# Patient Record
Sex: FEMALE | Race: WHITE | Employment: OTHER | URBAN - METROPOLITAN AREA
[De-identification: names, ages, dates, MRNs, and addresses within clinical notes are randomized per-mention and may not be internally consistent; named-entity substitution may affect disease eponyms.]

---

## 2022-02-23 LAB
HBSAG, EXTERNAL: NEGATIVE
HIV, EXTERNAL: NEGATIVE
RPR, EXTERNAL: NON REACTIVE
RUBELLA, EXTERNAL: NORMAL
TYPE, ABO & RH, EXTERNAL: NORMAL

## 2022-05-31 ENCOUNTER — HOSPITAL ENCOUNTER (OUTPATIENT)
Dept: PERINATAL CARE | Age: 32
Discharge: HOME | End: 2022-05-31
Attending: OBSTETRICS & GYNECOLOGY
Payer: COMMERCIAL

## 2022-05-31 PROCEDURE — 76811 OB US DETAILED SNGL FETUS: CPT | Performed by: OBSTETRICS & GYNECOLOGY

## 2022-06-28 ENCOUNTER — HOSPITAL ENCOUNTER (OUTPATIENT)
Dept: PERINATAL CARE | Age: 32
Discharge: HOME | End: 2022-06-28
Attending: OBSTETRICS & GYNECOLOGY
Payer: COMMERCIAL

## 2022-06-28 PROCEDURE — 76816 OB US FOLLOW-UP PER FETUS: CPT | Performed by: OBSTETRICS & GYNECOLOGY

## 2022-06-28 RX ORDER — FEXOFENADINE HCL AND PSEUDOEPHEDRINE HCI 60; 120 MG/1; MG/1
1 TABLET, EXTENDED RELEASE ORAL EVERY 12 HOURS
COMMUNITY
End: 2022-09-23

## 2022-08-25 LAB — GRBS, EXTERNAL: POSITIVE

## 2022-08-30 ENCOUNTER — HOSPITAL ENCOUNTER (OUTPATIENT)
Age: 32
Setting detail: OBSERVATION
Discharge: HOME | End: 2022-08-30
Attending: STUDENT IN AN ORGANIZED HEALTH CARE EDUCATION/TRAINING PROGRAM | Admitting: STUDENT IN AN ORGANIZED HEALTH CARE EDUCATION/TRAINING PROGRAM
Payer: COMMERCIAL

## 2022-08-30 VITALS
WEIGHT: 165 LBS | BODY MASS INDEX: 29.23 KG/M2 | RESPIRATION RATE: 16 BRPM | HEART RATE: 84 BPM | HEIGHT: 63 IN | DIASTOLIC BLOOD PRESSURE: 71 MMHG | TEMPERATURE: 98.8 F | OXYGEN SATURATION: 98 % | SYSTOLIC BLOOD PRESSURE: 120 MMHG

## 2022-08-30 PROCEDURE — 99285 EMERGENCY DEPT VISIT HI MDM: CPT

## 2022-08-30 PROCEDURE — 74011250636 HC RX REV CODE- 250/636: Performed by: STUDENT IN AN ORGANIZED HEALTH CARE EDUCATION/TRAINING PROGRAM

## 2022-08-30 PROCEDURE — 59025 FETAL NON-STRESS TEST: CPT

## 2022-08-30 PROCEDURE — 96372 THER/PROPH/DIAG INJ SC/IM: CPT

## 2022-08-30 PROCEDURE — G0378 HOSPITAL OBSERVATION PER HR: HCPCS

## 2022-08-30 PROCEDURE — 59412 ANTEPARTUM MANIPULATION: CPT

## 2022-08-30 RX ORDER — TERBUTALINE SULFATE 1 MG/ML
0.25 INJECTION SUBCUTANEOUS ONCE
Status: COMPLETED | OUTPATIENT
Start: 2022-08-30 | End: 2022-08-30

## 2022-08-30 RX ORDER — SODIUM CHLORIDE 0.9 % (FLUSH) 0.9 %
5-40 SYRINGE (ML) INJECTION EVERY 8 HOURS
Status: DISCONTINUED | OUTPATIENT
Start: 2022-08-30 | End: 2022-08-30 | Stop reason: HOSPADM

## 2022-08-30 RX ORDER — ASPIRIN 81 MG/1
81 TABLET ORAL DAILY
COMMUNITY
End: 2022-09-23

## 2022-08-30 RX ORDER — SODIUM CHLORIDE 0.9 % (FLUSH) 0.9 %
5-40 SYRINGE (ML) INJECTION AS NEEDED
Status: DISCONTINUED | OUTPATIENT
Start: 2022-08-30 | End: 2022-08-30 | Stop reason: HOSPADM

## 2022-08-30 RX ADMIN — TERBUTALINE SULFATE 0.25 MG: 1 INJECTION SUBCUTANEOUS at 07:54

## 2022-08-30 NOTE — PROCEDURES
External Cephalic Version Procedure Note:    Pre procedure Diagnosis:   1. Breech position     Post procedure diagnosis:   Breech position    Procedure:   1. External cephalic version     Surgeon:  Eladia Razo MD    Assistant(s):  Amanda Garces DO    Complications:  None    Condition:  Stable    Description of the Procedure:  Fetus visualized by sonography. Breech position noted. Normal fluid. HR in 150's. Terbutaline given prior to procedure start. Ultrasound gel applied to abdomen, and then fetal buttocks lifted out of the pelvis. The fetus was swept counterclockwise, then clockwise, and then counterclockwise one final time, but attempts were unsuccessful to vert the fetus to cephalic presentation. FHT's were 150's post procedure.       Eladia Razo MD  8/30/2022  8:36 AM

## 2022-08-30 NOTE — PROGRESS NOTES
8/30/2022  5:36 AM  Received to [facility] for external version. Consent signed. Pt states she is not sure baby is breech anymore. States Cortney Ac is not where it usually is\". Dr Akosua Salinas at bedside. Breech confirmed    5389 EFM applied    0622 Tracing reactive. Monitor off at this time    65 Dr Jonny Hoffmann and Dr Sailaja Choe at bedside. Version attempted- unsuccessful    0826 EFM reapplied    5084 Tracing reactive- monitor off. Discharge instructions reviewed and signed. 0930 Discharged ambulatory accompanied by .  RTO 1 week

## 2022-08-30 NOTE — H&P
Obstetrics Admission H&P    Vero Garcia  [id number]  1990    Chief Complaint: Breech presentation    HPI: 29 y.o.  with IUP at 37w2d who presents for scheduled external cephalic version in the setting of breech presentation. Pregnancy has been complicated by  bilateral CP cysts (resolved by 28 weeks), history of genital HSV, and GBS positive status . Patient has had no complaints. Reports normal fetal movement. US last week, on 22, revealed breech presentation with an EFW of 2649g (23%), SHELDON 15 cm, and posterior placenta. ROS:  A comprehensive review of systems was negative. OB History          1    Para        Term                AB        Living             SAB        IAB        Ectopic        Molar        Multiple        Live Births                  No past medical history on file. No past surgical history on file. Social History     Socioeconomic History    Marital status:      Spouse name: Not on file    Number of children: Not on file    Years of education: Not on file    Highest education level: Not on file   Occupational History    Not on file   Tobacco Use    Smoking status: Not on file    Smokeless tobacco: Not on file   Substance and Sexual Activity    Alcohol use: Not on file    Drug use: Not on file    Sexual activity: Not on file   Other Topics Concern    Not on file   Social History Narrative    Not on file     Social Determinants of Health     Financial Resource Strain: Not on file   Food Insecurity: Not on file   Transportation Needs: Not on file   Physical Activity: Not on file   Stress: Not on file   Social Connections: Not on file   Intimate Partner Violence: Not on file   Housing Stability: Not on file     No family history on file. No Known Allergies  Cannot display prior to admission medications because the patient has not been admitted in this contact. Vitals:  No data found. No data recorded. I&O:  No intake/output data recorded. No intake/output data recorded. Exam:  Patient without distress. Abdomen soft, non-tender               Fundus soft and non tender    Cervical Exam:  Deferred  Membranes:   Intact    Fetal Heart Rate: Pending    Labs:   O+, antibody screen negative    Assessment and Plan:    29 y.o.     IUP at 37w2d  Breech presentation  Bilateral CP cysts, resolved  History of genital HSV  GBS positive    Proceed with ECV. Risks discussed, including fetal HR abnormalities necessitating delivery (potentially by emergent  section), placental abruption, rupture of membranes. Also discussed potential to be unsuccessful, and that primary  section would be indicated for persistent breech presentation. Will give dose of terbutaline just prior to attempt, will intermittently monitor FHR with ultrasound. At completion of the procedure, will repeat NST to ensure reassuring fetal status prior to discharge home.     Romayne Beams, MD

## 2022-09-20 NOTE — H&P
History & Physical    Name: Gio Marte MRN: [id number]  SSN: [id number]    YOB: 1990  Age: 29 y.o. Sex: female      Subjective: scheduled C/S     Estimated Date of Delivery: 22  OB History          1    Para        Term                AB        Living             SAB        IAB        Ectopic        Molar        Multiple        Live Births                    Ms. Araceli Tomlinson is a , admitted with pregnancy at 40w3d for  section due to persistent breech presentation. Unsuccessful ECV was attempted at 37 weeks. Prenatal course has been complicated by bilateral CP cysts (resolved by 28 weeks), history of genital HSV (on valtrex suppression), and GBS positive status. Please see prenatal records for details. Past Medical History:   Diagnosis Date    Genital herpes     will start suppression this week- no outbreak for several years     Past Surgical History:   Procedure Laterality Date    HX OTHER SURGICAL      wisdom teeth     Social History     Occupational History    Not on file   Tobacco Use    Smoking status: Not on file    Smokeless tobacco: Not on file   Substance and Sexual Activity    Alcohol use: Not on file    Drug use: Not on file    Sexual activity: Not on file     No family history on file. No Known Allergies  Prior to Admission medications    Medication Sig Start Date End Date Taking? Authorizing Provider   aspirin delayed-release 81 mg tablet Take 81 mg by mouth daily. Provider, Historical   prenatal multivit-ca-min-fe-fa tab Take 1 Tablet by mouth daily. Indications: pregnancy    Provider, Historical   fexofenadine-pseudoephedrine (ALLEGRA-D)  mg Tb12 Take 1 Tablet by mouth every twelve (12) hours. Provider, Historical        Review of Systems: A comprehensive review of systems was negative. Objective:     Vitals: There were no vitals filed for this visit. Physical Exam:  Patient without distress.   Fundus: soft and non tender  Membranes: Intact  Fetal Heart Rate: Reactive    Prenatal Labs:   Lab Results   Component Value Date/Time    Rubella, External immune 2022 12:00 AM    GrBStrep, External positive 2022 12:00 AM    HBsAg, External negative 2022 12:00 AM    HIV, External negative 2022 12:00 AM    RPR, External non reactive 2022 12:00 AM        Impression/Plan:     Plan:  Admit for  section. Group B Strep was positive, will be getting ancef prior to delivery. Discussed the risks of surgery including the risks of bleeding, infection, deep vein thrombosis, and surgical injuries to internal organs including but not limited to the bowels, bladder, rectum, and female reproductive organs. The patient understands the risks; any and all questions were answered to the patient's satisfaction.     Signed By:  Darryl Crain MD     2022

## 2022-09-21 ENCOUNTER — HOSPITAL ENCOUNTER (INPATIENT)
Age: 32
LOS: 2 days | Discharge: HOME | End: 2022-09-23
Attending: STUDENT IN AN ORGANIZED HEALTH CARE EDUCATION/TRAINING PROGRAM | Admitting: STUDENT IN AN ORGANIZED HEALTH CARE EDUCATION/TRAINING PROGRAM
Payer: COMMERCIAL

## 2022-09-21 ENCOUNTER — ANESTHESIA EVENT (OUTPATIENT)
Dept: LABOR AND DELIVERY | Age: 32
End: 2022-09-21
Payer: COMMERCIAL

## 2022-09-21 ENCOUNTER — ANESTHESIA (OUTPATIENT)
Dept: LABOR AND DELIVERY | Age: 32
End: 2022-09-21
Payer: COMMERCIAL

## 2022-09-21 LAB
ABO + RH BLD: NORMAL
BASOPHILS # BLD: 0 K/UL (ref 0–0.1)
BASOPHILS NFR BLD: 0 % (ref 0–1)
BLOOD GROUP ANTIBODIES SERPL: NORMAL
DIFFERENTIAL METHOD BLD: ABNORMAL
EOSINOPHIL # BLD: 0.1 K/UL (ref 0–0.4)
EOSINOPHIL NFR BLD: 1 % (ref 0–7)
ERYTHROCYTE [DISTWIDTH] IN BLOOD BY AUTOMATED COUNT: 12.9 % (ref 11.5–14.5)
HCT VFR BLD AUTO: 34.7 % (ref 35–47)
HGB BLD-MCNC: 12.1 G/DL (ref 11.5–16)
IMM GRANULOCYTES # BLD AUTO: 0 K/UL (ref 0–0.04)
IMM GRANULOCYTES NFR BLD AUTO: 0 % (ref 0–0.5)
LYMPHOCYTES # BLD: 3 K/UL (ref 0.8–3.5)
LYMPHOCYTES NFR BLD: 29 % (ref 12–49)
MCH RBC QN AUTO: 31.9 PG (ref 26–34)
MCHC RBC AUTO-ENTMCNC: 34.9 G/DL (ref 30–36.5)
MCV RBC AUTO: 91.6 FL (ref 80–99)
MONOCYTES # BLD: 0.8 K/UL (ref 0–1)
MONOCYTES NFR BLD: 8 % (ref 5–13)
NEUTS SEG # BLD: 6.5 K/UL (ref 1.8–8)
NEUTS SEG NFR BLD: 62 % (ref 32–75)
NRBC # BLD: 0 K/UL (ref 0–0.01)
NRBC BLD-RTO: 0 PER 100 WBC
PLATELET # BLD AUTO: 235 K/UL (ref 150–400)
PMV BLD AUTO: 10.3 FL (ref 8.9–12.9)
RBC # BLD AUTO: 3.79 M/UL (ref 3.8–5.2)
SPECIMEN EXP DATE BLD: NORMAL
WBC # BLD AUTO: 10.4 K/UL (ref 3.6–11)

## 2022-09-21 PROCEDURE — 75410000003 HC RECOV DEL/VAG/CSECN EA 0.5 HR: Performed by: STUDENT IN AN ORGANIZED HEALTH CARE EDUCATION/TRAINING PROGRAM

## 2022-09-21 PROCEDURE — 74011250636 HC RX REV CODE- 250/636: Performed by: ANESTHESIOLOGY

## 2022-09-21 PROCEDURE — 4A1HXCZ MONITORING OF PRODUCTS OF CONCEPTION, CARDIAC RATE, EXTERNAL APPROACH: ICD-10-PCS | Performed by: STUDENT IN AN ORGANIZED HEALTH CARE EDUCATION/TRAINING PROGRAM

## 2022-09-21 PROCEDURE — 76010000392 HC C SECN EA ADDL 0.5 HR: Performed by: STUDENT IN AN ORGANIZED HEALTH CARE EDUCATION/TRAINING PROGRAM

## 2022-09-21 PROCEDURE — 77030007866 HC KT SPN ANES BBMI -B: Performed by: ANESTHESIOLOGY

## 2022-09-21 PROCEDURE — 76010000391 HC C SECN FIRST 1 HR: Performed by: STUDENT IN AN ORGANIZED HEALTH CARE EDUCATION/TRAINING PROGRAM

## 2022-09-21 PROCEDURE — 74011250636 HC RX REV CODE- 250/636: Performed by: NURSE PRACTITIONER

## 2022-09-21 PROCEDURE — 74011000250 HC RX REV CODE- 250: Performed by: STUDENT IN AN ORGANIZED HEALTH CARE EDUCATION/TRAINING PROGRAM

## 2022-09-21 PROCEDURE — 36415 COLL VENOUS BLD VENIPUNCTURE: CPT

## 2022-09-21 PROCEDURE — 65410000002 HC RM PRIVATE OB

## 2022-09-21 PROCEDURE — 74011000250 HC RX REV CODE- 250: Performed by: ANESTHESIOLOGY

## 2022-09-21 PROCEDURE — 86900 BLOOD TYPING SEROLOGIC ABO: CPT

## 2022-09-21 PROCEDURE — 85025 COMPLETE CBC W/AUTO DIFF WBC: CPT

## 2022-09-21 PROCEDURE — 74011000250 HC RX REV CODE- 250: Performed by: NURSE PRACTITIONER

## 2022-09-21 PROCEDURE — 76060000078 HC EPIDURAL ANESTHESIA: Performed by: STUDENT IN AN ORGANIZED HEALTH CARE EDUCATION/TRAINING PROGRAM

## 2022-09-21 PROCEDURE — 74011250636 HC RX REV CODE- 250/636: Performed by: STUDENT IN AN ORGANIZED HEALTH CARE EDUCATION/TRAINING PROGRAM

## 2022-09-21 RX ORDER — VALACYCLOVIR HYDROCHLORIDE 500 MG/1
500 TABLET, FILM COATED ORAL 2 TIMES DAILY
COMMUNITY
End: 2022-09-23

## 2022-09-21 RX ORDER — KETOROLAC TROMETHAMINE 30 MG/ML
30 INJECTION, SOLUTION INTRAMUSCULAR; INTRAVENOUS
Status: ACTIVE | OUTPATIENT
Start: 2022-09-21 | End: 2022-09-22

## 2022-09-21 RX ORDER — ONDANSETRON 2 MG/ML
INJECTION INTRAMUSCULAR; INTRAVENOUS AS NEEDED
Status: DISCONTINUED | OUTPATIENT
Start: 2022-09-21 | End: 2022-09-21 | Stop reason: HOSPADM

## 2022-09-21 RX ORDER — PROPOFOL 10 MG/ML
INJECTION, EMULSION INTRAVENOUS AS NEEDED
Status: DISCONTINUED | OUTPATIENT
Start: 2022-09-21 | End: 2022-09-21

## 2022-09-21 RX ORDER — SODIUM CHLORIDE 0.9 % (FLUSH) 0.9 %
5-40 SYRINGE (ML) INJECTION AS NEEDED
Status: DISCONTINUED | OUTPATIENT
Start: 2022-09-21 | End: 2022-09-23 | Stop reason: HOSPADM

## 2022-09-21 RX ORDER — HYDROCORTISONE 1 %
CREAM (GRAM) TOPICAL AS NEEDED
Status: DISCONTINUED | OUTPATIENT
Start: 2022-09-21 | End: 2022-09-23 | Stop reason: HOSPADM

## 2022-09-21 RX ORDER — DOCUSATE SODIUM 100 MG/1
100 CAPSULE, LIQUID FILLED ORAL
Status: DISCONTINUED | OUTPATIENT
Start: 2022-09-21 | End: 2022-09-23 | Stop reason: HOSPADM

## 2022-09-21 RX ORDER — DIPHENHYDRAMINE HCL 25 MG
25 CAPSULE ORAL
Status: DISCONTINUED | OUTPATIENT
Start: 2022-09-21 | End: 2022-09-23 | Stop reason: HOSPADM

## 2022-09-21 RX ORDER — OXYTOCIN/RINGER'S LACTATE 30/500 ML
87.3 PLASTIC BAG, INJECTION (ML) INTRAVENOUS AS NEEDED
Status: DISCONTINUED | OUTPATIENT
Start: 2022-09-21 | End: 2022-09-23 | Stop reason: HOSPADM

## 2022-09-21 RX ORDER — ACETAMINOPHEN 325 MG/1
650 TABLET ORAL EVERY 6 HOURS
Status: DISCONTINUED | OUTPATIENT
Start: 2022-09-21 | End: 2022-09-23 | Stop reason: HOSPADM

## 2022-09-21 RX ORDER — SODIUM CHLORIDE 0.9 % (FLUSH) 0.9 %
5-40 SYRINGE (ML) INJECTION EVERY 8 HOURS
Status: DISCONTINUED | OUTPATIENT
Start: 2022-09-21 | End: 2022-09-23 | Stop reason: HOSPADM

## 2022-09-21 RX ORDER — IBUPROFEN 400 MG/1
800 TABLET ORAL EVERY 8 HOURS
Status: DISCONTINUED | OUTPATIENT
Start: 2022-09-22 | End: 2022-09-22

## 2022-09-21 RX ORDER — SIMETHICONE 80 MG
80 TABLET,CHEWABLE ORAL
Status: DISCONTINUED | OUTPATIENT
Start: 2022-09-21 | End: 2022-09-23 | Stop reason: HOSPADM

## 2022-09-21 RX ORDER — KETOROLAC TROMETHAMINE 30 MG/ML
30 INJECTION, SOLUTION INTRAMUSCULAR; INTRAVENOUS EVERY 6 HOURS
Status: DISPENSED | OUTPATIENT
Start: 2022-09-21 | End: 2022-09-22

## 2022-09-21 RX ORDER — OXYTOCIN/RINGER'S LACTATE 30/500 ML
87.3 PLASTIC BAG, INJECTION (ML) INTRAVENOUS AS NEEDED
Status: COMPLETED | OUTPATIENT
Start: 2022-09-21 | End: 2022-09-21

## 2022-09-21 RX ORDER — BUPIVACAINE HYDROCHLORIDE 7.5 MG/ML
INJECTION, SOLUTION EPIDURAL; RETROBULBAR
Status: COMPLETED | OUTPATIENT
Start: 2022-09-21 | End: 2022-09-21

## 2022-09-21 RX ORDER — PHENYLEPHRINE HCL IN 0.9% NACL 0.4MG/10ML
SYRINGE (ML) INTRAVENOUS AS NEEDED
Status: DISCONTINUED | OUTPATIENT
Start: 2022-09-21 | End: 2022-09-21 | Stop reason: HOSPADM

## 2022-09-21 RX ORDER — ONDANSETRON 2 MG/ML
4 INJECTION INTRAMUSCULAR; INTRAVENOUS
Status: ACTIVE | OUTPATIENT
Start: 2022-09-21 | End: 2022-09-22

## 2022-09-21 RX ORDER — SODIUM CHLORIDE, SODIUM LACTATE, POTASSIUM CHLORIDE, CALCIUM CHLORIDE 600; 310; 30; 20 MG/100ML; MG/100ML; MG/100ML; MG/100ML
125 INJECTION, SOLUTION INTRAVENOUS CONTINUOUS
Status: DISCONTINUED | OUTPATIENT
Start: 2022-09-21 | End: 2022-09-21

## 2022-09-21 RX ORDER — MORPHINE SULFATE 0.5 MG/ML
INJECTION, SOLUTION EPIDURAL; INTRATHECAL; INTRAVENOUS AS NEEDED
Status: DISCONTINUED | OUTPATIENT
Start: 2022-09-21 | End: 2022-09-21 | Stop reason: HOSPADM

## 2022-09-21 RX ORDER — KETOROLAC TROMETHAMINE 30 MG/ML
INJECTION, SOLUTION INTRAMUSCULAR; INTRAVENOUS AS NEEDED
Status: DISCONTINUED | OUTPATIENT
Start: 2022-09-21 | End: 2022-09-21 | Stop reason: HOSPADM

## 2022-09-21 RX ORDER — OXYCODONE AND ACETAMINOPHEN 5; 325 MG/1; MG/1
1 TABLET ORAL
Status: DISCONTINUED | OUTPATIENT
Start: 2022-09-21 | End: 2022-09-23 | Stop reason: HOSPADM

## 2022-09-21 RX ORDER — SODIUM CHLORIDE, SODIUM LACTATE, POTASSIUM CHLORIDE, CALCIUM CHLORIDE 600; 310; 30; 20 MG/100ML; MG/100ML; MG/100ML; MG/100ML
125 INJECTION, SOLUTION INTRAVENOUS CONTINUOUS
Status: DISCONTINUED | OUTPATIENT
Start: 2022-09-21 | End: 2022-09-22

## 2022-09-21 RX ORDER — AMMONIA 15 % (W/V)
1 AMPUL (EA) INHALATION AS NEEDED
Status: DISCONTINUED | OUTPATIENT
Start: 2022-09-21 | End: 2022-09-23 | Stop reason: HOSPADM

## 2022-09-21 RX ORDER — OXYTOCIN/RINGER'S LACTATE 30/500 ML
10 PLASTIC BAG, INJECTION (ML) INTRAVENOUS AS NEEDED
Status: DISCONTINUED | OUTPATIENT
Start: 2022-09-21 | End: 2022-09-23 | Stop reason: HOSPADM

## 2022-09-21 RX ORDER — MORPHINE SULFATE 10 MG/ML
10 INJECTION, SOLUTION INTRAMUSCULAR; INTRAVENOUS
Status: ACTIVE | OUTPATIENT
Start: 2022-09-21 | End: 2022-09-22

## 2022-09-21 RX ORDER — PROPOFOL 10 MG/ML
INJECTION, EMULSION INTRAVENOUS AS NEEDED
Status: DISCONTINUED | OUTPATIENT
Start: 2022-09-21 | End: 2022-09-21 | Stop reason: HOSPADM

## 2022-09-21 RX ORDER — DEXMEDETOMIDINE HYDROCHLORIDE 100 UG/ML
INJECTION, SOLUTION INTRAVENOUS AS NEEDED
Status: DISCONTINUED | OUTPATIENT
Start: 2022-09-21 | End: 2022-09-21 | Stop reason: HOSPADM

## 2022-09-21 RX ORDER — MORPHINE SULFATE 10 MG/ML
6 INJECTION, SOLUTION INTRAMUSCULAR; INTRAVENOUS
Status: ACTIVE | OUTPATIENT
Start: 2022-09-21 | End: 2022-09-22

## 2022-09-21 RX ORDER — SODIUM CHLORIDE, SODIUM LACTATE, POTASSIUM CHLORIDE, CALCIUM CHLORIDE 600; 310; 30; 20 MG/100ML; MG/100ML; MG/100ML; MG/100ML
INJECTION, SOLUTION INTRAVENOUS
Status: DISCONTINUED | OUTPATIENT
Start: 2022-09-21 | End: 2022-09-21 | Stop reason: HOSPADM

## 2022-09-21 RX ORDER — OXYCODONE AND ACETAMINOPHEN 5; 325 MG/1; MG/1
2 TABLET ORAL
Status: DISCONTINUED | OUTPATIENT
Start: 2022-09-21 | End: 2022-09-23 | Stop reason: HOSPADM

## 2022-09-21 RX ORDER — NALBUPHINE HYDROCHLORIDE 20 MG/ML
5 INJECTION, SOLUTION INTRAMUSCULAR; INTRAVENOUS; SUBCUTANEOUS
Status: DISCONTINUED | OUTPATIENT
Start: 2022-09-21 | End: 2022-09-23 | Stop reason: HOSPADM

## 2022-09-21 RX ORDER — OXYTOCIN/RINGER'S LACTATE 30/500 ML
10 PLASTIC BAG, INJECTION (ML) INTRAVENOUS AS NEEDED
Status: DISCONTINUED | OUTPATIENT
Start: 2022-09-21 | End: 2022-09-21

## 2022-09-21 RX ORDER — ONDANSETRON 4 MG/1
4 TABLET, ORALLY DISINTEGRATING ORAL
Status: DISCONTINUED | OUTPATIENT
Start: 2022-09-21 | End: 2022-09-23 | Stop reason: HOSPADM

## 2022-09-21 RX ORDER — HYDROCORTISONE ACETATE PRAMOXINE HCL 2.5; 1 G/100G; G/100G
CREAM TOPICAL AS NEEDED
Status: DISCONTINUED | OUTPATIENT
Start: 2022-09-21 | End: 2022-09-23 | Stop reason: HOSPADM

## 2022-09-21 RX ORDER — MORPHINE SULFATE 1 MG/ML
INJECTION, SOLUTION EPIDURAL; INTRATHECAL; INTRAVENOUS
Status: COMPLETED | OUTPATIENT
Start: 2022-09-21 | End: 2022-09-21

## 2022-09-21 RX ADMIN — PROPOFOL 30 MG: 10 INJECTION, EMULSION INTRAVENOUS at 08:57

## 2022-09-21 RX ADMIN — SODIUM CHLORIDE 40 MCG/MIN: 9 INJECTION, SOLUTION INTRAVENOUS at 08:12

## 2022-09-21 RX ADMIN — KETOROLAC TROMETHAMINE 30 MG: 30 INJECTION, SOLUTION INTRAMUSCULAR; INTRAVENOUS at 09:06

## 2022-09-21 RX ADMIN — KETOROLAC TROMETHAMINE 30 MG: 30 INJECTION, SOLUTION INTRAMUSCULAR; INTRAVENOUS at 15:41

## 2022-09-21 RX ADMIN — Medication 80 MCG: at 08:45

## 2022-09-21 RX ADMIN — PROPOFOL 30 MG: 10 INJECTION, EMULSION INTRAVENOUS at 08:51

## 2022-09-21 RX ADMIN — MORPHINE SULFATE 0.25 MG: 1 INJECTION EPIDURAL; INTRATHECAL; INTRAVENOUS at 08:16

## 2022-09-21 RX ADMIN — PROPOFOL 50 MG: 10 INJECTION, EMULSION INTRAVENOUS at 08:42

## 2022-09-21 RX ADMIN — Medication 909 ML/HR: at 08:39

## 2022-09-21 RX ADMIN — CEFAZOLIN 2 G: 1 INJECTION, POWDER, FOR SOLUTION INTRAMUSCULAR; INTRAVENOUS at 08:06

## 2022-09-21 RX ADMIN — PROPOFOL 30 MG: 10 INJECTION, EMULSION INTRAVENOUS at 08:48

## 2022-09-21 RX ADMIN — SODIUM CHLORIDE, POTASSIUM CHLORIDE, SODIUM LACTATE AND CALCIUM CHLORIDE 1000 ML: 600; 310; 30; 20 INJECTION, SOLUTION INTRAVENOUS at 08:07

## 2022-09-21 RX ADMIN — BUPIVACAINE HYDROCHLORIDE 11 MG: 7.5 INJECTION, SOLUTION EPIDURAL; RETROBULBAR at 08:16

## 2022-09-21 RX ADMIN — DEXMEDETOMIDINE HYDROCHLORIDE 10 MCG: 100 INJECTION, SOLUTION, CONCENTRATE INTRAVENOUS at 08:49

## 2022-09-21 RX ADMIN — SODIUM CHLORIDE, POTASSIUM CHLORIDE, SODIUM LACTATE AND CALCIUM CHLORIDE: 600; 310; 30; 20 INJECTION, SOLUTION INTRAVENOUS at 08:08

## 2022-09-21 RX ADMIN — ONDANSETRON HYDROCHLORIDE 4 MG: 2 INJECTION, SOLUTION INTRAMUSCULAR; INTRAVENOUS at 08:11

## 2022-09-21 RX ADMIN — Medication 3.25 MG: at 08:42

## 2022-09-21 RX ADMIN — PROPOFOL 30 MG: 10 INJECTION, EMULSION INTRAVENOUS at 08:45

## 2022-09-21 RX ADMIN — SODIUM CHLORIDE, PRESERVATIVE FREE 10 ML: 5 INJECTION INTRAVENOUS at 21:56

## 2022-09-21 RX ADMIN — PROPOFOL 30 MG: 10 INJECTION, EMULSION INTRAVENOUS at 08:54

## 2022-09-21 RX ADMIN — SODIUM CHLORIDE, POTASSIUM CHLORIDE, SODIUM LACTATE AND CALCIUM CHLORIDE 999 ML/HR: 600; 310; 30; 20 INJECTION, SOLUTION INTRAVENOUS at 06:37

## 2022-09-21 RX ADMIN — KETOROLAC TROMETHAMINE 30 MG: 30 INJECTION, SOLUTION INTRAMUSCULAR; INTRAVENOUS at 21:56

## 2022-09-21 NOTE — INTERVAL H&P NOTE
Update History & Physical    The Patient's History and Physical of September 20,   Plan was reviewed with the patient and I examined the patient. There was no change. Breech presentation was confirmed with bedside ultrasound. Plan:  The risk, benefits, expected outcome, and alternative to the recommended procedure have been discussed with the patient. Patient understands and wants to proceed with the procedure.     Electronically signed by Petrona Clark MD on 9/21/2022 at 8:03 AM

## 2022-09-21 NOTE — ANESTHESIA POSTPROCEDURE EVALUATION
Procedure(s):   SECTION. spinal    Anesthesia Post Evaluation        Patient participation: complete - patient participated  Level of consciousness: awake  Pain management: adequate  Airway patency: patent  Anesthetic complications: no  Cardiovascular status: hemodynamically stable  Respiratory status: acceptable  Hydration status: acceptable  Comments: The patient is ready for PACU discharge. Letty Loera DO                   Post anesthesia nausea and vomiting:  controlled      INITIAL Post-op Vital signs:   Vitals Value Taken Time   /86 22 1016   Temp 36.5 °C (97.7 °F) 22 0922   Pulse 49 22 1016   Resp 18 22 0922   SpO2 96 % 22 1029   Vitals shown include unvalidated device data.

## 2022-09-21 NOTE — ANESTHESIA PROCEDURE NOTES
Spinal Block    Start time: 9/21/2022 8:10 AM  End time: 9/21/2022 8:16 AM  Performed by: Westley Huang MD  Authorized by: Ambrosio Howell DO     Pre-procedure:   Indications: primary anesthetic  Preanesthetic Checklist: patient identified, risks and benefits discussed, anesthesia consent, site marked, patient being monitored and timeout performed      Spinal Block:   Patient Position:  Seated  Prep Region:  Lumbar  Prep: Betadine and patient draped      Location:  L3-4  Technique:  Single shot  Local: bupivacaine (PF) (MARCAINE) 0.75 % (7.5 mg/mL) Intrathecal - Intrathecal   11 mg - 9/21/2022 8:16:00 AM  morphine (PF) 1 mg/mL Intrathecal - Intrathecal   0.25 mg - 9/21/2022 8:16:00 AM    Med Admin Time: 9/21/2022 8:16 AM    Needle:   Needle Type:  Pencan  Needle Gauge:  24 G  Attempts:  1      Events: CSF confirmed, no blood with aspiration and no paresthesia        Assessment:  Insertion:  Uncomplicated  Patient tolerance:  Patient tolerated the procedure well with no immediate complications

## 2022-09-21 NOTE — LACTATION NOTE
This note was copied from a baby's chart. Infant born this morning via C/S to a  mom at 40 3/7 weeks gestation. Mom has a negative history impacting lactation and noted breast changes during the pregnancy. Mom states she had dry, cracked nipples prior to delivery and was using Shelby Sear Cream for nipple treatment. Infant latched and nursed following delivery. Assisted mom at this time with waking infant, demonstrating wake up techniques. Infant latched well with rhythmic sucks and occasional swallows noted. Encouraged breast massage while infant nursed. Feeding Plan: Mother will keep baby skin to skin as often as possible, feed on demand, 8-12x/day , respond to feeding cues, obtain latch, listen for audible swallowing, be aware of signs of oxytocin release/ cramping,thirst,sleepiness while breastfeeding, offer both breasts,and will not limit feedings. Mother agrees to utilize breast massage while nursing to facilitate lactogenesis.

## 2022-09-21 NOTE — ROUTINE PROCESS
TRANSFER - IN REPORT:    Verbal report received from [address] [place] [address] RN(name) on Vinnie Arteaga  being received from LD(unit) for routine progression of care      Report consisted of patients Situation, Background, Assessment and   Recommendations(SBAR). Information from the following report(s) SBAR was reviewed with the receiving nurse. Opportunity for questions and clarification was provided. Assessment completed upon patients arrival to unit and care assumed.

## 2022-09-21 NOTE — PROGRESS NOTES
0600 Patient arrived ambulatory for scheduled  section for breech presentation. 0730 Report given to RANCHO Ricks RN to assume care of patient at this time.

## 2022-09-21 NOTE — ANESTHESIA PREPROCEDURE EVALUATION
Relevant Problems   No relevant active problems       Anesthetic History   No history of anesthetic complications            Review of Systems / Medical History  Patient summary reviewed, nursing notes reviewed and pertinent labs reviewed    Pulmonary  Within defined limits                 Neuro/Psych   Within defined limits           Cardiovascular  Within defined limits                     GI/Hepatic/Renal  Within defined limits              Endo/Other  Within defined limits           Other Findings              Physical Exam    Airway  Mallampati: II  TM Distance: > 6 cm  Neck ROM: normal range of motion   Mouth opening: Normal     Cardiovascular  Regular rate and rhythm,  S1 and S2 normal,  no murmur, click, rub, or gallop             Dental  No notable dental hx       Pulmonary  Breath sounds clear to auscultation               Abdominal  GI exam deferred       Other Findings            Anesthetic Plan    ASA: 2  Anesthesia type: spinal      Post-op pain plan if not by surgeon: intrathecal opiates    Induction: Intravenous  Anesthetic plan and risks discussed with: Patient

## 2022-09-21 NOTE — L&D DELIVERY NOTE
Delivery Summary    Patient: Angi Rahman MRN: [id number]  SSN: [id number]    YOB: 1990  Age: 29 y.o. Sex: female        Information for the patient's :  Layvonne Apley [[id number]]     Labor Events:    Labor: No    Steroids: None   Cervical Ripening Date/Time:       Cervical Ripening Type: None   Antibiotics During Labor:     Rupture Identifier: Sac 1    Rupture Date/Time: 2022 8:34 AM   Rupture Type: AROM   Amniotic Fluid Volume:  Moderate    Amniotic Fluid Description: Clear    Amniotic Fluid Odor: None    Induction: None       Induction Date/Time:        Indications for Induction:      Augmentation: None   Augmentation Date/Time:      Indications for Augmentation:     Labor complications: None       Additional complications:        Delivery Events:  Indications For Episiotomy:     Episiotomy: None   Perineal Laceration(s): None   Repaired:     Periurethral Laceration Location:      Repaired:     Labial Laceration Location:     Repaired:     Sulcal Laceration Location:     Repaired:     Vaginal Laceration Location:     Repaired:     Cervical Laceration Location:     Repaired:     Repair Suture: None   Number of Repair Packets:     Estimated Blood Loss (ml):  ml   Quantitaive Blood Loss (ml):        275 mL     Delivery Date: 2022    Delivery Time: 8:36 AM   Delivery Type: , Low Transverse     Details    Trial of Labor: No   Primary/Repeat: Primary   Priority: Routine   Indications:  Breech       Sex:  Male     Gestational Age: 44w3d  Delivery Clinician:  Kristi Metz  Living Status: Living   Delivery Location: L&D L&D OR 1          APGARS  One minute Five minutes Ten minutes   Skin color: 1   1        Heart rate: 2   2        Grimace: 2   2        Muscle tone: 2   2        Breathin   2        Totals: 8   9          Presentation: Breech    Position:        Resuscitation Method:  Tactile Stimulation;Suctioning-bulb     Meconium Stained: None Cord Information: 3 Vessels  Complications: None  Cord around:    Delayed cord clamping? Yes  Cord clamped date/time:2022  8:37 AM  Disposition of Cord Blood: Lab    Blood Gases Sent?: No    Placenta:  Date/Time: 2022  8:40 AM  Removal: Expressed      Appearance: Normal;Intact     [place] Measurements:  Birth Weight: 3.28 kg      Birth Length: 53.3 cm      Head Circumference: 35 cm      Chest Circumference:       Abdominal Girth: 32.5 cm    Other Providers:   Kana MIRANDA;DACIA LINARES;CHERELLE AWAD;JOSE R MEDEROS;NAHUM VÁSQUEZ, Obstetrician;Primary Nurse;Primary [place] Nurse; Anesthesiologist;Crna         Group B Strep:   Lab Results   Component Value Date/Time    GrBStrep, External positive 2022 12:00 AM     Information for the patient's :  Heaven Watts [[id number]]   No results found for: ABORH, PCTABR, PCTDIG, BILI, ABORHEXT, ABORH   No results for input(s): PCO2CB, PO2CB, HCO3I, SO2I, IBD, PTEMPI, SPECTI, PHICB, ISITE, IDEV, IALLEN in the last 72 hours.

## 2022-09-21 NOTE — OP NOTES
Section Operative Note    Preoperative diagnosis  1. Intrauterine pregnancy at 40w3d  2. Breech presentation  3. GBS positive    Postoperative diagnosis  Same and delivered    Surgeon  Denny Sanchez MD    Assistant(s)  Fdiel Mclaughlin RN    Operation performed  Primary low transverse  Section    Anesthesia type  Regional via spinal    Complications  None    Operative Findings:  Viable male  delivered angela breech at 0510. Weight 3280g. APGARs 8 & 9. Placenta delivered intact at [address] [facility]. Normal uterus, tubes, and ovaries bilaterally. Specimens removed  Placenta, discarded  Cord blood obtained and sent to laboratory    Drains/prosthesis  Perez to gravity    Quantitative blood loss   275 mL    Intravenous fluids   800 mL    Urine output   350 mL    Monitoring lines  Per Anesthesia    Condition postoperatively  Stable to the recovery room    Indication for the procedure  Raina Vera is a 29 y.o.  who presented at 40w3d for scheduled primary  section in the setting of persistent breech presentation, with prior unsuccessful ECV. Description of the procedure  Patient was taken to the operating room where her spinal anesthesia was placed. She was then placed in the supine position, a perez catheter was placed, and she was prepped and draped in the usual sterile fashion. A timeout was performed with all members of the surgical team. A Pfannenstiel skin incision was made with the scalpel and carried down to the underlying layer of the fascia. Fascia was then extended laterally with the use of the Israel scissors. Two Kocher clamps were placed on the anterior portion of the fascia and the underlying rectus muscles were dissected off with the use of the Israel scissors. Attention was then paid to the inferior portion of the fascia, where the rectus muscles were dissected off with the use of the Isreal scissors.  The midline was identified, the peritoneum was entered bluntly, and the rectus muscles were  laterally with the use of gentle traction. The Rudy retractor was placed, and a bladder flap was created with the use of the Metzenbaum scissors. A low transverse uterine incision was made with the scalpel. Hysterotomy was entered bluntly. The infant's buttocks was located and brought up to the level of the hysterotomy, and the infant was delivered atraumatically using standard breech maneuvers. The nose and mouth were suctioned, the cord was clamped and cut, and infant was handed off to the awaiting nurse. Cord blood was then obtained. The placenta was then delivered manually. The uterus was exteriorized, cleared of all clot and debris and the hysterotomy was closed in a running locked fashion with the use of 0 vicryl. The hysterotomy was noted to be hemostatic at the end of closure, after 2 additional figure-of-eight sutures were placed with 0 vicryl. The uterus was returned to the abdomen. The hysterotomy was again visualized and noted to be hemostatic. The pelvis was then irrigated and cleared of all clot and debris, and the Rudy retractor was removed. All areas of subfascial bleeding remained hemostatic with use of Bovie. The fascia was then closed in a running fashion with the use of 0 vicryl. The subcuticular fat layer was then irrigated and all areas of bleeding remained to be hemostatic with the use of the Bovie. The subcutaneous layer was closed in a running fashion using 2-0 plain gut. The skin was then closed with the use of 4-0 monocryl in a subcuticular fashion. Dermabond was applied to the wound at the end of the procedure. Sponge, lap, and needle counts were correct times two. The patient tolerated the procedure well and was transported to the recovery room in stable condition. The patient did receive 2 g of Ancef preoperatively.       Romayne Beams, MD  9/21/2022  9:11 AM

## 2022-09-22 LAB
BASOPHILS # BLD: 0 K/UL (ref 0–0.1)
BASOPHILS NFR BLD: 0 % (ref 0–1)
DIFFERENTIAL METHOD BLD: ABNORMAL
EOSINOPHIL # BLD: 0.1 K/UL (ref 0–0.4)
EOSINOPHIL NFR BLD: 1 % (ref 0–7)
ERYTHROCYTE [DISTWIDTH] IN BLOOD BY AUTOMATED COUNT: 13 % (ref 11.5–14.5)
HCT VFR BLD AUTO: 33.4 % (ref 35–47)
HGB BLD-MCNC: 11.5 G/DL (ref 11.5–16)
IMM GRANULOCYTES # BLD AUTO: 0 K/UL (ref 0–0.04)
IMM GRANULOCYTES NFR BLD AUTO: 0 % (ref 0–0.5)
LYMPHOCYTES # BLD: 2.1 K/UL (ref 0.8–3.5)
LYMPHOCYTES NFR BLD: 22 % (ref 12–49)
MCH RBC QN AUTO: 32.2 PG (ref 26–34)
MCHC RBC AUTO-ENTMCNC: 34.4 G/DL (ref 30–36.5)
MCV RBC AUTO: 93.6 FL (ref 80–99)
MONOCYTES # BLD: 0.7 K/UL (ref 0–1)
MONOCYTES NFR BLD: 7 % (ref 5–13)
NEUTS SEG # BLD: 6.6 K/UL (ref 1.8–8)
NEUTS SEG NFR BLD: 70 % (ref 32–75)
NRBC # BLD: 0 K/UL (ref 0–0.01)
NRBC BLD-RTO: 0 PER 100 WBC
PLATELET # BLD AUTO: 213 K/UL (ref 150–400)
PMV BLD AUTO: 10.2 FL (ref 8.9–12.9)
RBC # BLD AUTO: 3.57 M/UL (ref 3.8–5.2)
WBC # BLD AUTO: 9.5 K/UL (ref 3.6–11)

## 2022-09-22 PROCEDURE — 74011250637 HC RX REV CODE- 250/637: Performed by: STUDENT IN AN ORGANIZED HEALTH CARE EDUCATION/TRAINING PROGRAM

## 2022-09-22 PROCEDURE — 85025 COMPLETE CBC W/AUTO DIFF WBC: CPT

## 2022-09-22 PROCEDURE — 65410000002 HC RM PRIVATE OB

## 2022-09-22 PROCEDURE — 36415 COLL VENOUS BLD VENIPUNCTURE: CPT

## 2022-09-22 PROCEDURE — 74011250636 HC RX REV CODE- 250/636: Performed by: STUDENT IN AN ORGANIZED HEALTH CARE EDUCATION/TRAINING PROGRAM

## 2022-09-22 PROCEDURE — 74011000250 HC RX REV CODE- 250: Performed by: STUDENT IN AN ORGANIZED HEALTH CARE EDUCATION/TRAINING PROGRAM

## 2022-09-22 RX ORDER — IBUPROFEN 400 MG/1
800 TABLET ORAL EVERY 8 HOURS
Status: DISCONTINUED | OUTPATIENT
Start: 2022-09-22 | End: 2022-09-23 | Stop reason: HOSPADM

## 2022-09-22 RX ADMIN — KETOROLAC TROMETHAMINE 30 MG: 30 INJECTION, SOLUTION INTRAMUSCULAR; INTRAVENOUS at 05:36

## 2022-09-22 RX ADMIN — ACETAMINOPHEN 650 MG: 325 TABLET, FILM COATED ORAL at 11:51

## 2022-09-22 RX ADMIN — IBUPROFEN 800 MG: 400 TABLET, FILM COATED ORAL at 12:37

## 2022-09-22 RX ADMIN — IBUPROFEN 800 MG: 400 TABLET, FILM COATED ORAL at 20:09

## 2022-09-22 RX ADMIN — ACETAMINOPHEN 650 MG: 325 TABLET, FILM COATED ORAL at 20:09

## 2022-09-22 RX ADMIN — DOCUSATE SODIUM 100 MG: 100 CAPSULE, LIQUID FILLED ORAL at 11:51

## 2022-09-22 RX ADMIN — SODIUM CHLORIDE, PRESERVATIVE FREE 10 ML: 5 INJECTION INTRAVENOUS at 05:36

## 2022-09-22 NOTE — ROUTINE PROCESS
Bedside shift change report given to TANJA Fields RN (oncoming nurse) by Christian Lemon RN (offgoing nurse). Report included the following information SBAR and Kardex.

## 2022-09-22 NOTE — PROGRESS NOTES
Anesthesia Post Operative Day 1      Patient is s/p spinal with DuraMorph for Cesearean Section. The patient relates mild pain, none itching and none  nausea. All symptoms were treated with protocol meds with good results. Patient is up and ambulating without complaints.      Plan: Continue protocols as needed

## 2022-09-22 NOTE — PROGRESS NOTES
Post-Operative  Day 1    Arron Lesches is a 29 y.o. , who is POD 1 s/p 1LTCS at 40w3d 2/2 breech presentation, with previous unsuccessful ECV. Information for the patient's :  Domingo López [[id number]]   , Low Transverse      Patient doing well without unusual complaints. Tolerating regular diet, has passed flatus. Ambulating and voiding without difficulty. Pain is well controlled. Breastfeeding. Vitals:  Visit Vitals  /83   Pulse 72   Temp 98.6 °F (37 °C)   Resp 15   Ht 5' 3\" (1.6 m)   Wt 74.8 kg (165 lb)   SpO2 99%   Breastfeeding Unknown   BMI 29.23 kg/m²     Temp (24hrs), Av.2 °F (36.8 °C), Min:97.7 °F (36.5 °C), Max:99.1 °F (37.3 °C)      Last 24hr Input/Output:    Intake/Output Summary (Last 24 hours) at 2022 0816  Last data filed at 2022 1819  Gross per 24 hour   Intake 800 ml   Output 2600 ml   Net -1800 ml          Exam:       Patient without distress. Abdomen:soft, expected tenderness, fundus firm, dermabond intact     Lower extremities are nontender without edema.  No cords    Labs:   Lab Results   Component Value Date/Time    WBC 9.5 2022 05:30 AM    WBC 10.4 2022 06:33 AM    HGB 11.5 2022 05:30 AM    HGB 12.1 2022 06:33 AM    HCT 33.4 (L) 2022 05:30 AM    HCT 34.7 (L) 2022 06:33 AM    PLATELET 854  05:30 AM    PLATELET 014  06:33 AM       Recent Results (from the past 24 hour(s))   CBC WITH AUTOMATED DIFF    Collection Time: 22  5:30 AM   Result Value Ref Range    WBC 9.5 3.6 - 11.0 K/uL    RBC 3.57 (L) 3.80 - 5.20 M/uL    HGB 11.5 11.5 - 16.0 g/dL    HCT 33.4 (L) 35.0 - 47.0 %    MCV 93.6 80.0 - 99.0 FL    MCH 32.2 26.0 - 34.0 PG    MCHC 34.4 30.0 - 36.5 g/dL    RDW 13.0 11.5 - 14.5 %    PLATELET 440 630 - 216 K/uL    MPV 10.2 8.9 - 12.9 FL    NRBC 0.0 0  WBC    ABSOLUTE NRBC 0.00 0.00 - 0.01 K/uL    NEUTROPHILS 70 32 - 75 %    LYMPHOCYTES 22 12 - 49 %    MONOCYTES 7 5 - 13 %    EOSINOPHILS 1 0 - 7 %    BASOPHILS 0 0 - 1 %    IMMATURE GRANULOCYTES 0 0.0 - 0.5 %    ABS. NEUTROPHILS 6.6 1.8 - 8.0 K/UL    ABS. LYMPHOCYTES 2.1 0.8 - 3.5 K/UL    ABS. MONOCYTES 0.7 0.0 - 1.0 K/UL    ABS. EOSINOPHILS 0.1 0.0 - 0.4 K/UL    ABS. BASOPHILS 0.0 0.0 - 0.1 K/UL    ABS. IMM. GRANS. 0.0 0.00 - 0.04 K/UL    DF AUTOMATED             Assessment: Post-Op day 1, stable  Rh positive, Rubella immune, GBS positive  Baby boy, desires circ    Plan:   1. Routine post-operative care   2. The risks and benefits of the circumcision  procedure and anesthesia including: bleeding, infection, variability of cosmetic results were discussed at length with the mother. She is aware that future repeat procedures may be necessary. She gives informed consent to proceed as noted and her questions are answered.    3. Likely discharge home tomorrow if continues to do well, will follow up with me in the office in 2 weeks      Yoana Longo MD  9/22/2022  8:19 AM

## 2022-09-22 NOTE — ROUTINE PROCESS
Bedside shift change report given to Amy Oakes RN (oncoming nurse) by Rodney Collazo RN (offgoing nurse). Report included the following information SBAR.

## 2022-09-22 NOTE — LACTATION NOTE
This note was copied from a baby's chart. Lactation follow up- Baby now sleepy post circumcision but has been nursing with shield for the last couple of feedings due to Mom's sore nipples. She has a \"hickey\" above her L nipple and nipples are reddened. She is using Harvis Birgit Campos's cream to her nipples after nursing. We reviewed signs of a good latch vs a poor latch. Mom states baby starts out with a deep latch but slides to the end of the nipple. Colostrum noted in shield after nursing. Mom can easily express colostrum. 24 hr wt loss at 4%. All questions answered.

## 2022-09-23 VITALS
BODY MASS INDEX: 29.23 KG/M2 | OXYGEN SATURATION: 98 % | HEIGHT: 63 IN | RESPIRATION RATE: 16 BRPM | HEART RATE: 74 BPM | TEMPERATURE: 97.9 F | WEIGHT: 165 LBS | SYSTOLIC BLOOD PRESSURE: 130 MMHG | DIASTOLIC BLOOD PRESSURE: 90 MMHG

## 2022-09-23 PROCEDURE — 74011250637 HC RX REV CODE- 250/637: Performed by: STUDENT IN AN ORGANIZED HEALTH CARE EDUCATION/TRAINING PROGRAM

## 2022-09-23 RX ORDER — IBUPROFEN 800 MG/1
800 TABLET ORAL
Qty: 30 TABLET | Refills: 1 | Status: SHIPPED | OUTPATIENT
Start: 2022-09-23

## 2022-09-23 RX ORDER — OXYCODONE AND ACETAMINOPHEN 5; 325 MG/1; MG/1
1 TABLET ORAL
Qty: 20 TABLET | Refills: 0 | Status: SHIPPED | OUTPATIENT
Start: 2022-09-23 | End: 2022-09-28

## 2022-09-23 RX ADMIN — ACETAMINOPHEN 650 MG: 325 TABLET, FILM COATED ORAL at 12:11

## 2022-09-23 RX ADMIN — ACETAMINOPHEN 650 MG: 325 TABLET, FILM COATED ORAL at 08:08

## 2022-09-23 RX ADMIN — ACETAMINOPHEN 650 MG: 325 TABLET, FILM COATED ORAL at 02:34

## 2022-09-23 RX ADMIN — IBUPROFEN 800 MG: 400 TABLET, FILM COATED ORAL at 04:11

## 2022-09-23 RX ADMIN — DOCUSATE SODIUM 100 MG: 100 CAPSULE, LIQUID FILLED ORAL at 08:09

## 2022-09-23 RX ADMIN — IBUPROFEN 800 MG: 400 TABLET, FILM COATED ORAL at 12:11

## 2022-09-23 NOTE — LACTATION NOTE
This note was copied from a baby's chart. Mom and baby scheduled for discharge today. Baby nursing well and has improved throughout post partum stay, deep latch maintained, mother is comfortable, milk is in transition, baby feeding vigorously with rhythmic suck, swallow, breathe pattern, with audible swallowing, and evident milk transfer, both breasts offerd, baby is asleep following feeding. Baby is feeding on demand. Moms nipples are bruised and sore and she is using the nipple shield. Baby is swallowing at the breast using the shield. We reviewed cluster feeding, engorgement and pumping. Breast feeding teaching completed and all questions answered.

## 2022-09-23 NOTE — PROGRESS NOTES
Bedside and Verbal shift change report given to MARCO A Swartz RN (oncoming nurse) by Ron Samaniego RN (offgoing nurse). Report included the following information SBAR and MAR.      2000: Patient would like an early discharge is able. Will do discharge labs this evening for baby.

## 2022-09-23 NOTE — PROGRESS NOTES
Post-Operative  Day 2    [facility]     Information for the patient's :  Asharamos Wallis [[id number]]   , Low Transverse  Patient doing well without unusual complaints. Passing flatus, voiding and ambulating without difficulty. Tolerating regular diet. Vitals:  Visit Vitals  /76 (BP 1 Location: Right upper arm, BP Patient Position: At rest;Sitting)   Pulse 72   Temp 98.5 °F (36.9 °C)   Resp 16   Ht 5' 3\" (1.6 m)   Wt 74.8 kg (165 lb)   SpO2 100%   Breastfeeding Unknown   BMI 29.23 kg/m²     Temp (24hrs), Av.2 °F (36.8 °C), Min:97.9 °F (36.6 °C), Max:98.5 °F (36.9 °C)        Exam:      Patient without distress. Abdomen: soft, expected tenderness, fundus firm                Wound incision clean, dry and intact               Lower extremities are nontender without edema. No cords    Labs:   Lab Results   Component Value Date/Time    WBC 9.5 2022 05:30 AM    WBC 10.4 2022 06:33 AM    HGB 11.5 2022 05:30 AM    HGB 12.1 2022 06:33 AM    HCT 33.4 (L) 2022 05:30 AM    HCT 34.7 (L) 2022 06:33 AM    PLATELET 014  05:30 AM    PLATELET 767  06:33 AM       No results found for this or any previous visit (from the past 24 hour(s)). Assessment: Post-Op day 2, doing well. Rh+/RI/male infant s/p uncomplicated circumcision. Plan:   1. Discharge today, instructions and precautions reviewed. Follow up in the office with Dr. Zoey Nath in 2 weeks.     Signed By: Emanuel Fowler DO     2022

## 2022-09-23 NOTE — ROUTINE PROCESS
Bedside and Verbal shift change report given to Maddy Ayers RN (oncoming nurse) by Reginald Cobos. EMILY Swartz (offgoing nurse). Report included the following information SBAR.      1793: I have reviewed discharge instructions with the patient. The patient verbalized understanding.

## 2022-09-23 NOTE — ROUTINE PROCESS
1930- Bedside and Verbal shift change report given to MARCO A Swartz RN and Lita Bernardo, EMILY  (oncoming nurse) by Azael Yañez RN and RANCHO Wiggins RN (offgoing nurse). Report given with SBAR, Kardex, Intake/Output and MAR.    0735- Bedside and Verbal shift change report given to MARCO A Spicer RN (oncoming nurse) by Ita Delarosa. EMILY Swartz and AYUSH Fountain RN (offgoing nurse). Report given with SBAR, Kardex, Intake/Output and MAR. Preceptor review of MARCO A Swartz RN shift time [phone]. The documentation on patient care has been approved and reviewed. All medications have been administered under the direct supervision of the preceptor.

## 2022-09-23 NOTE — DISCHARGE SUMMARY
Obstetrical Discharge Summary     Name: Norberto Toussaint MRN: [id number]  SSN: [id number]    YOB: 1990  Age: 29 y.o. Sex: female      Allergies: Patient has no known allergies. Admit Date: 2022    Discharge Date: 2022     Admitting Physician: Ebony Harris MD     Attending Physician:  Brian Landaverde MD     * Admission Diagnoses: Breech presentation [address]    * Discharge Diagnoses:   Information for the patient's :  Remy Earing [[id number]]   Delivery of a 3.28 kg male infant via , Low Transverse on 2022 at 8:36 AM  by Ebony Harris. Apgars were 8  and 9 . Additional Diagnoses:   Hospital Problems as of 2022 Never Reviewed            Codes Class Noted - Resolved POA    Breech presentation ICD-10-CM: O32. 1XX0  ICD-9-CM: 652.20  2022 - Present Unknown          Lab Results   Component Value Date/Time    ABO/Rh(D) O POSITIVE 2022 06:33 AM    Rubella, External immune 2022 12:00 AM    GrBStrep, External positive 2022 12:00 AM    ABO,Rh O positive 2022 12:00 AM      There is no immunization history on file for this patient. * Procedures: spinal anesthesia  Procedure(s):   SECTION           * Discharge Condition: good    [facility] Course: Normal hospital course following the delivery. * Disposition: Home    Discharge Medications:   Current Discharge Medication List        START taking these medications    Details   ibuprofen (MOTRIN) 800 mg tablet Take 1 Tablet by mouth every eight (8) hours as needed for Pain. Qty: 30 Tablet, Refills: 1  Start date: 2022    Associated Diagnoses:  delivery delivered      oxyCODONE-acetaminophen (PERCOCET) 5-325 mg per tablet Take 1 Tablet by mouth every six (6) hours as needed for Pain for up to 5 days. Max Daily Amount: 4 Tablets.   Qty: 20 Tablet, Refills: 0  Start date: 2022, End date: 2022    Associated Diagnoses:  delivery delivered CONTINUE these medications which have NOT CHANGED    Details   prenatal multivit-ca-min-fe-fa tab Take 1 Tablet by mouth daily. Indications: pregnancy           STOP taking these medications       valACYclovir (VALTREX) 500 mg tablet Comments:   Reason for Stopping:         aspirin delayed-release 81 mg tablet Comments:   Reason for Stopping:         fexofenadine-pseudoephedrine (ALLEGRA-D)  mg Tb12 Comments:   Reason for Stopping:               * Follow-up Care/Patient Instructions: Activity: No sex for 6 weeks  Diet: Regular Diet  Wound Care: Keep wound clean and dry    Follow-up Information    None                       .

## (undated) DEVICE — EXTRA LARGE, DISPOSABLE C-SECTION PROTECTOR - RETRACTOR: Brand: ALEXIS ® O C-SECTION PROTECTOR - RETRACTOR

## (undated) DEVICE — CATH FOLEY 16F LUBRI-SIL IC --

## (undated) DEVICE — PREP SKN CHLRAPRP APL 26ML STR --

## (undated) DEVICE — SOLUTION IRRIG 1000ML 0.9% SOD CHL USP POUR PLAS BTL

## (undated) DEVICE — COVERALL PREM SMS 2XL KNIT --

## (undated) DEVICE — STERILE POLYISOPRENE POWDER-FREE SURGICAL GLOVES WITH EMOLLIENT COATING: Brand: PROTEXIS

## (undated) DEVICE — LIGHT HANDLE: Brand: DEVON

## (undated) DEVICE — REM POLYHESIVE ADULT PATIENT RETURN ELECTRODE: Brand: VALLEYLAB

## (undated) DEVICE — SOLUTION IRRIG 1000ML STRL H2O USP PLAS POUR BTL

## (undated) DEVICE — SPINAL TRAY NDL 24 GAX4 IN SPROTTE

## (undated) DEVICE — STERILE POLYISOPRENE POWDER-FREE SURGICAL GLOVES: Brand: PROTEXIS

## (undated) DEVICE — DERMABOND SKIN ADH 0.7ML -- DERMABOND ADVANCED 12/BX

## (undated) DEVICE — ELECTROSURGICAL DEVICE HOLSTER;FOR USE WITH MAXIMUM PEAK VOLTAGE OF 4000 V: Brand: FORCE TRIVERSE

## (undated) DEVICE — SOLIDIFIER FLUID 3000 CC ABSORB

## (undated) DEVICE — SUTURE PLN GUT SZ 2-0 L27IN ABSRB YELLOWISH TAN L70MM XLH 53T

## (undated) DEVICE — SURGICAL PROCEDURE PACK TISS 3X5 IN ABSORBABLE SEPRAFILM

## (undated) DEVICE — SUTURE MCRYL SZ 3-0 L27IN ABSRB UD L60MM KS STR REV CUT Y523H

## (undated) DEVICE — PACK PROCEDURE SURG C SECT KT SMH

## (undated) DEVICE — STICK SPONGE PRESAT PVP PAINT STERILE

## (undated) DEVICE — SUTURE VCRL SZ 0 L36IN ABSRB VLT L40MM CT 1/2 CIR J358H